# Patient Record
Sex: MALE | Race: BLACK OR AFRICAN AMERICAN | URBAN - METROPOLITAN AREA
[De-identification: names, ages, dates, MRNs, and addresses within clinical notes are randomized per-mention and may not be internally consistent; named-entity substitution may affect disease eponyms.]

---

## 2020-02-13 ENCOUNTER — NEW PATIENT (OUTPATIENT)
Dept: URBAN - METROPOLITAN AREA CLINIC 10 | Facility: CLINIC | Age: 60
End: 2020-02-13
Payer: COMMERCIAL

## 2020-02-13 DIAGNOSIS — H25.811 COMBINED FORMS OF AGE-RELATED CATARACT, RIGHT EYE: ICD-10-CM

## 2020-02-13 DIAGNOSIS — H40.2214 CHRONIC ANGLE CLOSURE OF RIGHT EYE, INDETERMINATE STAGE: ICD-10-CM

## 2020-02-13 DIAGNOSIS — Z79.4 LONG TERM (CURRENT) USE OF INSULIN: ICD-10-CM

## 2020-02-13 PROCEDURE — 92020 GONIOSCOPY: CPT | Performed by: OPTOMETRIST

## 2020-02-13 PROCEDURE — 92250 FUNDUS PHOTOGRAPHY W/I&R: CPT | Performed by: OPTOMETRIST

## 2020-02-13 PROCEDURE — 92004 COMPRE OPH EXAM NEW PT 1/>: CPT | Performed by: OPTOMETRIST

## 2020-02-13 ASSESSMENT — VISUAL ACUITY
OD: 20/20
OS: 20/NLP

## 2020-02-13 ASSESSMENT — KERATOMETRY: OD: 42.50

## 2020-02-13 ASSESSMENT — INTRAOCULAR PRESSURE: OD: 29

## 2020-02-24 ENCOUNTER — POST OP (OUTPATIENT)
Dept: URBAN - METROPOLITAN AREA CLINIC 10 | Facility: CLINIC | Age: 60
End: 2020-02-24
Payer: COMMERCIAL

## 2020-02-24 DIAGNOSIS — H40.031 ANATOMICAL NARROW ANGLE, RIGHT EYE: Primary | ICD-10-CM

## 2020-02-24 DIAGNOSIS — E11.9 TYPE 2 DIABETES MELLITUS WITHOUT COMPLICATIONS: ICD-10-CM

## 2020-02-24 PROCEDURE — 92083 EXTENDED VISUAL FIELD XM: CPT | Performed by: OPHTHALMOLOGY

## 2020-02-24 PROCEDURE — 92133 CPTRZD OPH DX IMG PST SGM ON: CPT | Performed by: OPHTHALMOLOGY

## 2020-02-24 PROCEDURE — 92020 GONIOSCOPY: CPT | Performed by: OPHTHALMOLOGY

## 2020-02-24 PROCEDURE — 76514 ECHO EXAM OF EYE THICKNESS: CPT | Performed by: OPHTHALMOLOGY

## 2020-02-24 PROCEDURE — 99204 OFFICE O/P NEW MOD 45 MIN: CPT | Performed by: OPHTHALMOLOGY

## 2020-02-24 RX ORDER — PREDNISOLONE ACETATE 10 MG/ML
1 % SUSPENSION/ DROPS OPHTHALMIC
Qty: 1 | Refills: 1 | Status: INACTIVE
Start: 2020-02-24 | End: 2020-03-16

## 2020-02-24 ASSESSMENT — INTRAOCULAR PRESSURE: OD: 23

## 2020-03-06 ENCOUNTER — Encounter (OUTPATIENT)
Dept: URBAN - METROPOLITAN AREA CLINIC 10 | Facility: CLINIC | Age: 60
End: 2020-03-06
Payer: COMMERCIAL

## 2020-03-06 DIAGNOSIS — Z01.818 ENCOUNTER FOR OTHER PREPROCEDURAL EXAMINATION: Primary | ICD-10-CM

## 2020-03-06 PROCEDURE — 99213 OFFICE O/P EST LOW 20 MIN: CPT | Performed by: PHYSICIAN ASSISTANT

## 2020-03-16 ENCOUNTER — SURGERY (OUTPATIENT)
Dept: URBAN - METROPOLITAN AREA SURGERY 5 | Facility: SURGERY | Age: 60
End: 2020-03-16
Payer: COMMERCIAL

## 2020-03-16 PROCEDURE — 66761 REVISION OF IRIS: CPT | Performed by: OPHTHALMOLOGY

## 2020-03-16 RX ORDER — PREDNISOLONE ACETATE 10 MG/ML
1 % SUSPENSION/ DROPS OPHTHALMIC
Qty: 1 | Refills: 1 | Status: INACTIVE
Start: 2020-03-16 | End: 2020-12-11

## 2020-12-11 ENCOUNTER — FOLLOW UP ESTABLISHED (OUTPATIENT)
Dept: URBAN - METROPOLITAN AREA CLINIC 10 | Facility: CLINIC | Age: 60
End: 2020-12-11
Payer: COMMERCIAL

## 2020-12-11 DIAGNOSIS — H44.522 ATROPHY OF GLOBE, LEFT EYE: ICD-10-CM

## 2020-12-11 PROCEDURE — 92014 COMPRE OPH EXAM EST PT 1/>: CPT | Performed by: OPHTHALMOLOGY

## 2020-12-11 RX ORDER — LATANOPROST 50 UG/ML
0.005 % SOLUTION OPHTHALMIC
Qty: 2.5 | Refills: 0 | Status: INACTIVE
Start: 2020-12-11 | End: 2021-01-04

## 2020-12-11 ASSESSMENT — INTRAOCULAR PRESSURE: OD: 28

## 2021-09-17 ENCOUNTER — OFFICE VISIT (OUTPATIENT)
Dept: URBAN - METROPOLITAN AREA CLINIC 10 | Facility: CLINIC | Age: 61
End: 2021-09-17
Payer: COMMERCIAL

## 2021-09-17 DIAGNOSIS — H40.1111 PRIMARY OPEN-ANGLE GLAUCOMA, MILD STAGE, RIGHT EYE: Primary | ICD-10-CM

## 2021-09-17 PROCEDURE — 92012 INTRM OPH EXAM EST PATIENT: CPT | Performed by: OPHTHALMOLOGY

## 2021-09-17 RX ORDER — LATANOPROST 50 UG/ML
0.005 % SOLUTION OPHTHALMIC
Qty: 2.5 | Refills: 2 | Status: INACTIVE
Start: 2021-09-17 | End: 2022-02-14

## 2021-09-17 ASSESSMENT — INTRAOCULAR PRESSURE: OD: 23

## 2021-09-17 NOTE — IMPRESSION/PLAN
Impression: Primary open-angle glaucoma, mild stage, right eye Plan: Pt has Narrow Angle Glaucoma Risk   Gonio :Bare SS 2-3+ PG Pachs: 0     Today's IOP : 23 Tmax &date: 29-OD
OS: phthisis, NLP Target IOP low to mid teens Pt denies Family hx of Glaucoma Right eye is the better seeing eye Last vf 2/24/2020 Non pattern loss C/D:0.6 round OCT: 93 2/24/2020 Pt denies Sulfa Allergy // Pt denies Lung /Heart dx Pt is currently using : No Drops Plan :
1. S/P LPI safe to dilate with tropicamide only 2. IOP remains elevated, so  RESTART Latanoprost QHS OD, ERx'd as requested. Emphasized and explained compliance. Poor compliance can lead to blindness. Call with any changes in vision, sudden onset of pain or new symptoms. 
3. RTC in 4-6 weeks for VF 24-2 OD and RNFL OCT

## 2022-03-04 ENCOUNTER — OFFICE VISIT (OUTPATIENT)
Dept: URBAN - METROPOLITAN AREA CLINIC 10 | Facility: CLINIC | Age: 62
End: 2022-03-04
Payer: COMMERCIAL

## 2022-03-04 PROCEDURE — 92012 INTRM OPH EXAM EST PATIENT: CPT | Performed by: OPHTHALMOLOGY

## 2022-03-04 PROCEDURE — 92133 CPTRZD OPH DX IMG PST SGM ON: CPT | Performed by: OPHTHALMOLOGY

## 2022-03-04 ASSESSMENT — INTRAOCULAR PRESSURE: OD: 16

## 2022-03-04 NOTE — IMPRESSION/PLAN
Impression: Primary open-angle glaucoma, mild stage, right eye Plan: Pt has Narrow Angle Glaucoma Risk   Gonio :Bare SS 2-3+ PG Pachs: 0     Today's IOP : 16     Tmax &date: 29-OD
OS: phthisis, NLP Target IOP low to mid teens Pt denies Family hx of Glaucoma Right eye is the better seeing eye Last vf 2/24/2020 Non pattern loss C/D:0.6 round OCT: 90 03/04/22 Pt denies Sulfa Allergy // Pt denies Lung /Heart dx Plan :
1. S/P LPI safe to dilate with tropicamide only 2. IOP is doing well today, Emphasized and explained compliance. Poor compliance can lead to blindness. Call with any changes in vision, sudden onset of pain or new symptoms. 
3. RTC in 6 months for iop check with optom with HVF

## 2022-12-13 ENCOUNTER — OFFICE VISIT (OUTPATIENT)
Dept: URBAN - METROPOLITAN AREA CLINIC 10 | Facility: CLINIC | Age: 62
End: 2022-12-13
Payer: COMMERCIAL

## 2022-12-13 DIAGNOSIS — H40.1111 PRIMARY OPEN-ANGLE GLAUCOMA, MILD STAGE, RIGHT EYE: Primary | ICD-10-CM

## 2022-12-13 DIAGNOSIS — H25.811 COMBINED FORMS OF AGE-RELATED CATARACT, RIGHT EYE: ICD-10-CM

## 2022-12-13 DIAGNOSIS — H44.522 ATROPHY OF GLOBE, LEFT EYE: ICD-10-CM

## 2022-12-13 PROCEDURE — 92012 INTRM OPH EXAM EST PATIENT: CPT | Performed by: OPTOMETRIST

## 2022-12-13 PROCEDURE — 92083 EXTENDED VISUAL FIELD XM: CPT | Performed by: OPTOMETRIST

## 2022-12-13 ASSESSMENT — INTRAOCULAR PRESSURE: OD: 18

## 2022-12-13 NOTE — IMPRESSION/PLAN
Impression: Phthisis bulbi of left eye Plan: Longstanding from childhood trauma. Monocular precautions discussed.

## 2022-12-13 NOTE — IMPRESSION/PLAN
Impression: Combined forms of age-related cataract, right eye Plan: No treatment currently recommended due to level of vision. Patient will monitor vision changes and contact us with any decrease in vision, will re-evaluate cataract on return visit.

## 2022-12-13 NOTE — IMPRESSION/PLAN
Impression: Primary open-angle glaucoma, mild stage, right eye Plan: Pt has Narrow Angle Glaucoma Risk   Gonio :Bare SS 2-3+ PG Pachs: 0     Today's IOP : 18    Tmax &date: 29-OD
OS: phthisis, NLP Target IOP low to mid teens Pt denies Family hx of Glaucoma Right eye is the better seeing eye Last vf 2/24/2020 Non pattern loss C/D:0.6 round OCT: 90 03/04/22 Pt denies Sulfa Allergy // Pt denies Lung /Heart dx Plan :
1. Cont. latanprost qhs OD. S/P LPI safe to dilate with tropicamide only HVF is unreliable, complete black field. 2. IOP is doing well today, Emphasized and explained compliance. Poor compliance can lead to blindness. Call with any changes in vision, sudden onset of pain or new symptoms. 3. RTC in 6 months for complete and order NFL OCT, and repeat HVF 24-2.

## 2023-06-13 ENCOUNTER — OFFICE VISIT (OUTPATIENT)
Dept: URBAN - METROPOLITAN AREA CLINIC 10 | Facility: CLINIC | Age: 63
End: 2023-06-13
Payer: COMMERCIAL

## 2023-06-13 DIAGNOSIS — E11.9 TYPE 2 DIABETES MELLITUS WITHOUT COMPLICATIONS: ICD-10-CM

## 2023-06-13 DIAGNOSIS — H25.811 COMBINED FORMS OF AGE-RELATED CATARACT, RIGHT EYE: ICD-10-CM

## 2023-06-13 DIAGNOSIS — H44.522 ATROPHY OF GLOBE, LEFT EYE: ICD-10-CM

## 2023-06-13 DIAGNOSIS — H40.1111 PRIMARY OPEN-ANGLE GLAUCOMA, MILD STAGE, RIGHT EYE: Primary | ICD-10-CM

## 2023-06-13 PROCEDURE — 92014 COMPRE OPH EXAM EST PT 1/>: CPT | Performed by: OPTOMETRIST

## 2023-06-13 PROCEDURE — 92083 EXTENDED VISUAL FIELD XM: CPT | Performed by: OPTOMETRIST

## 2023-06-13 PROCEDURE — 92133 CPTRZD OPH DX IMG PST SGM ON: CPT | Performed by: OPTOMETRIST

## 2023-06-13 ASSESSMENT — VISUAL ACUITY: OD: 20/20

## 2023-06-13 ASSESSMENT — INTRAOCULAR PRESSURE: OD: 22

## 2023-06-13 NOTE — IMPRESSION/PLAN
Impression: Primary open-angle glaucoma, mild stage, right eye Plan: Pt has Narrow Angle Glaucoma Risk   Gonio :Bare SS 2-3+ PG Pachs: 555       Tmax &date: 29-OD
OS: phthisis, NLP Target IOP low to mid teens Pt denies Family hx of Glaucoma Right eye is the better seeing eye Last vf 2/24/2020 Non pattern loss C/D:0.6 round OCT: 90 03/04/22 Pt denies Sulfa Allergy // Pt denies Lung /Heart dx Plan :
IOP sub-optimal OD. Pt. reports good compliance. NFL OCT remains normal and HVF remains full. Cont. latanoprost qhs OD. Add Betimol qam OD (denies heart or breathing issues). RTC 3 weeks for IOP check.

## 2023-07-17 ENCOUNTER — OFFICE VISIT (OUTPATIENT)
Dept: URBAN - METROPOLITAN AREA CLINIC 10 | Facility: CLINIC | Age: 63
End: 2023-07-17
Payer: MEDICARE

## 2023-07-17 DIAGNOSIS — H40.1111 PRIMARY OPEN-ANGLE GLAUCOMA, MILD STAGE, RIGHT EYE: Primary | ICD-10-CM

## 2023-07-17 PROCEDURE — 99213 OFFICE O/P EST LOW 20 MIN: CPT | Performed by: OPTOMETRIST

## 2023-07-17 RX ORDER — LATANOPROST 50 UG/ML
0.005 % SOLUTION OPHTHALMIC
Qty: 5 | Refills: 5 | Status: ACTIVE
Start: 2023-07-17

## 2023-07-17 RX ORDER — TIMOLOL MALEATE 6.8 MG/ML
0.5 % SOLUTION/ DROPS OPHTHALMIC
Qty: 10 | Refills: 0 | Status: ACTIVE
Start: 2023-07-17

## 2023-07-17 ASSESSMENT — INTRAOCULAR PRESSURE: OD: 18

## 2023-07-17 NOTE — IMPRESSION/PLAN
Impression: Primary open-angle glaucoma, mild stage, right eye Plan: Pt has Narrow Angle Glaucoma Risk   Gonio :Bare SS 2-3+ PG Pachs: 555       Tmax &date: 29-OD
OS: phthisis, NLP Target IOP low to mid teens Pt denies Family hx of Glaucoma Right eye is the better seeing eye Last vf 2/24/2020 Non pattern loss C/D:0.6 round OCT: 90 03/04/22 Pt denies Sulfa Allergy // Pt denies Lung /Heart dx Plan :
IOP improved with starting Betimol QAM.  Pt. reports good compliance. Last NFL OCT normal and last HVF full. Cont. latanoprost qhs OD and Betimol qam OD (denies heart or breathing issues). RTC 4-6 months for IOP check c Dr. Vahe Cortes.

## 2024-01-18 ENCOUNTER — OFFICE VISIT (OUTPATIENT)
Dept: URBAN - METROPOLITAN AREA CLINIC 10 | Facility: CLINIC | Age: 64
End: 2024-01-18
Payer: MEDICARE

## 2024-01-18 DIAGNOSIS — H25.811 COMBINED FORMS OF AGE-RELATED CATARACT, RIGHT EYE: ICD-10-CM

## 2024-01-18 DIAGNOSIS — H40.1111 PRIMARY OPEN-ANGLE GLAUCOMA, MILD STAGE, RIGHT EYE: Primary | ICD-10-CM

## 2024-01-18 DIAGNOSIS — H44.522 ATROPHY OF GLOBE, LEFT EYE: ICD-10-CM

## 2024-01-18 PROCEDURE — 99213 OFFICE O/P EST LOW 20 MIN: CPT | Performed by: OPTOMETRIST

## 2024-01-18 ASSESSMENT — INTRAOCULAR PRESSURE
OD: 17
OD: 21

## 2024-07-30 ENCOUNTER — OFFICE VISIT (OUTPATIENT)
Dept: URBAN - METROPOLITAN AREA CLINIC 10 | Facility: CLINIC | Age: 64
End: 2024-07-30
Payer: MEDICARE

## 2024-07-30 DIAGNOSIS — H25.811 COMBINED FORMS OF AGE-RELATED CATARACT, RIGHT EYE: ICD-10-CM

## 2024-07-30 DIAGNOSIS — H40.1111 PRIMARY OPEN-ANGLE GLAUCOMA, MILD STAGE, RIGHT EYE: Primary | ICD-10-CM

## 2024-07-30 DIAGNOSIS — H44.522 ATROPHY OF GLOBE, LEFT EYE: ICD-10-CM

## 2024-07-30 PROCEDURE — 99213 OFFICE O/P EST LOW 20 MIN: CPT | Performed by: OPTOMETRIST

## 2024-07-30 PROCEDURE — 92083 EXTENDED VISUAL FIELD XM: CPT | Performed by: OPTOMETRIST

## 2024-07-30 PROCEDURE — 92133 CPTRZD OPH DX IMG PST SGM ON: CPT | Performed by: OPTOMETRIST

## 2024-07-30 RX ORDER — TIMOLOL MALEATE 6.8 MG/ML
0.5 % SOLUTION/ DROPS OPHTHALMIC
Qty: 10 | Refills: 3 | Status: ACTIVE
Start: 2024-07-30

## 2024-07-30 RX ORDER — LATANOPROST 50 UG/ML
0.005 % SOLUTION OPHTHALMIC
Qty: 5 | Refills: 5 | Status: ACTIVE
Start: 2024-07-30

## 2024-07-30 ASSESSMENT — INTRAOCULAR PRESSURE: OD: 20

## 2025-04-10 ENCOUNTER — OFFICE VISIT (OUTPATIENT)
Dept: URBAN - METROPOLITAN AREA CLINIC 10 | Facility: CLINIC | Age: 65
End: 2025-04-10
Payer: MEDICARE

## 2025-04-10 DIAGNOSIS — H40.1111 PRIMARY OPEN-ANGLE GLAUCOMA, RIGHT EYE, MILD STAGE: ICD-10-CM

## 2025-04-10 DIAGNOSIS — H44.522 ATROPHY OF GLOBE, LEFT EYE: ICD-10-CM

## 2025-04-10 DIAGNOSIS — H25.811 COMBINED FORMS OF AGE-RELATED CATARACT, RIGHT EYE: ICD-10-CM

## 2025-04-10 DIAGNOSIS — E11.9 TYPE 2 DIABETES MELLITUS WITHOUT COMPLICATIONS: Primary | ICD-10-CM

## 2025-04-10 PROCEDURE — 99214 OFFICE O/P EST MOD 30 MIN: CPT | Performed by: OPTOMETRIST

## 2025-04-10 PROCEDURE — 92133 CPTRZD OPH DX IMG PST SGM ON: CPT | Performed by: OPTOMETRIST

## 2025-04-10 ASSESSMENT — INTRAOCULAR PRESSURE
OD: 18
OD: 17

## 2025-04-10 ASSESSMENT — VISUAL ACUITY: OD: 20/20

## 2025-04-10 ASSESSMENT — KERATOMETRY: OD: 42.13
